# Patient Record
Sex: MALE | Race: OTHER | HISPANIC OR LATINO | URBAN - METROPOLITAN AREA
[De-identification: names, ages, dates, MRNs, and addresses within clinical notes are randomized per-mention and may not be internally consistent; named-entity substitution may affect disease eponyms.]

---

## 2019-08-26 ENCOUNTER — EMERGENCY (EMERGENCY)
Facility: HOSPITAL | Age: 29
LOS: 1 days | Discharge: HOME | End: 2019-08-26
Attending: EMERGENCY MEDICINE
Payer: COMMERCIAL

## 2019-08-26 VITALS
HEART RATE: 79 BPM | HEIGHT: 62 IN | WEIGHT: 132.06 LBS | RESPIRATION RATE: 20 BRPM | DIASTOLIC BLOOD PRESSURE: 85 MMHG | OXYGEN SATURATION: 98 % | SYSTOLIC BLOOD PRESSURE: 129 MMHG | TEMPERATURE: 99 F

## 2019-08-26 PROCEDURE — 99282 EMERGENCY DEPT VISIT SF MDM: CPT

## 2019-08-26 NOTE — ED PROVIDER NOTE - CLINICAL SUMMARY MEDICAL DECISION MAKING FREE TEXT BOX
29 yr old male with no hx presents to ed c/o right hand burn about 40 mins pta.  pt states working, his right hand accidentally went into a pot of hot oil. workers immediately palced toothpaste to area.  pt states 5th finger feels blister otherwise no loss of motion or sensation.    encourage to use bacitracin, avoid popping blister and can see burn clinic if needed

## 2019-08-26 NOTE — ED PROVIDER NOTE - OBJECTIVE STATEMENT
29 yr old male with no hx presents to ed c/o right hand burn about 40 mins pta.  pt states working, his right hand accidentally went into a pot of hot oil. workers immediately palced toothpaste to area.  pt states 5th finger feels blister otherwise no loss of motion or sensation.

## 2024-05-03 NOTE — ED PROVIDER NOTE - DISCHARGE DATE
Physical Therapy    Visit Type: initial evaluation    Relevant History/Co-morbidities: Hx avascular necrotic R hip    SUBJECTIVE  Patient agreed to participate in therapy this date.  RN in agreement to work with patient for therapy session.  \"I still drive myself to the doctor.\" \"I used to work for [employer] in [place].\"  \"I know that if you don't move around, you start to lose your muscles.\"    Patient / Family Goal: return to previous functional status, maximize function and return home    Pain   Patient does not demonstrate pain behaviors.     OBJECTIVE     Cognitive Status   Level of Consciousness   - alert  Arousal Alertness   - appropriate responses to stimuli  Affect/Behavior    - calm, cooperative and pleasant  Orientation    - Oriented to: person, place, time and situation  Functional Communication   - Overall Status: within functional limits   - Forms of Communication: verbal  Attention Span    - Attention: intact  Following Direction   - follows all commands and directions consistently  Transition Between Tasks   - transitions without difficulty  Memory   - appears intact  Safety Awareness/Insight   - intact  Awareness of Deficits   - fully aware of deficits  Verbal Expression   - intact    Vitals:  Heart rate at beginning of session was 141-142 BPM; RN aware and present in room. Clearance received for patient to mobilize in room. Heart rate increased with physical activity; highest reading was 150 BPM after ambulating ~15 feet in room.    Posture:  - Seated: good forward head and thoracic kyphosis  - Standing: good forward head and thoracic kyphosis  - Shoulders: rounded      Range of Motion (ROM)   (degrees unless noted; active unless noted; norms in ( ); negative=lacking to 0, positive=beyond 0)  WFL: LLE, RLE  Comments: See Occupational Therapy note for further details regarding bilateral upper extremities.    Strength  (out of 5 unless noted, standard test position unless noted)   WFL:  LLE, RLE  Hip:    - Flexion:         Left: 2+         Right: 2-  Knee:    - Extension:         Left: 4-         Right: 4-  Ankle:    - Inversion:         Left: 3          Right: 3  Comments / Details: Patient observed to grossly move bilateral lower extremities against gravity with at least 3+/5 strength. Patient able to stand with adequate strength and ambulate with 2-wheeled rolling walker. See Occupational Therapy note for further details regarding bilateral upper extremities.      Sitting Balance  (CLINT = base of support)  Static      - Trial 1 details: stand by assist, with double UE support and with double LE support  Dynamic      - Trial 1 details: stand by assist, with double LE support and with double UE support    Standing Balance  (CLINT = base of support)  Firm Surface: Double Leg      - Static, Eyes Open       - Trial 1 details: contact guard and with double UE support     - Dynamic, Eyes Open       - Trial 1 details: contact guard and with double UE support       Transfers  Assistive devices: gait belt, 2-wheeled walker  - Sit to stand: moderate assist  - Stand to sit: minimal assist  Verbal cues provided to use bilateral upper extremities when reaching back/pushing off for sit <> stand transfers to maximize leverage and safety.    Ambulation / Gait  - Assistive device: gait belt and 2-wheeled walker  - Distance (feet unless otherwise indicated): 15  in room. Limited ambulation distance to stay within room, per RN request.  - Assist Level: moderate assist  - Surface: even  - Description: decreased jc/pace, decreased step length left, decreased step length right and narrow base of support  Patient with good response to ambulation trial. Difficulty observed with right hip flexion during swing phase of gait, limited patient's ability to achieve proper heel strike. Patient benefits from multiple verbal cues to widen base of support and increase bilateral step size for improved dynamic stability. Limited  ambulation distance this session due to Heart rate 150 BPM; RN aware. Patient also reports increased dizziness. Patient was safely seated and repositioned comfortably. Symptoms improved once patient at rest.     Interventions     Supine    Lower Extremity: Right: SLR, hip abduction and hip adduction (gait-belt assist due to necrotic hip pain), AROM, 10 reps, 1 sets  Training provided: activity tolerance, bed mobility training, safety training, use of assistive device, functional ambulation, transfer training and balance retraining    Skilled input: Verbal instruction/cues and tactile instruction/cues  Verbal Consent: Writer verbally educated and received verbal consent for hand placement, positioning of patient, and techniques to be performed today from patient for clothing adjustments for techniques, therapist position for techniques and hand placement and palpation for techniques as described above and how they are pertinent to the patient's plan of care.         Education:   - Present and ready to learn: patient  Education provided during session:  - Role of Physical Therapy, benefits of skilled physical therapy, importance of out-of-bed activity, activity progression, energy conservation, therapy goals, and plan of care  - Results of above outlined education: Verbalizes understanding and Demonstrates understanding    ASSESSMENT   Patient will benefit from skilled therapy to address listed impairments and functional limitations.  Interferring components: decreased activity tolerance    Discharge needs based on today's assessment:   - Current level of function: slightly below baseline level of function   - Therapy needs at discharge: therapy 1-3 times per week (Pending progress, pain control, and weakness)   - Activities of daily living (ADLs) requiring support at discharge: bed mobility, transfers, ambulation and stairs   - Instrumental activities of daily living (IADLs) requiring support at discharge: community  mobility, home management and emergency responses   - Impairments that require further therapy intervention: activity tolerance, pain, balance and strength    AM-PAC  - Generalized Prior Level of Function: IND/MOD I ([facility] 22-24)       Key: MOD A=moderate assistance, IND/MOD I=independent/modified independent  - Generalized Current Level of Function     - Current Mobility Score: 15       AM-PAC Scoring Key= >21 Modified Independent; 20-21 Supervision; 18-19 Minimal assist; 13-18 Moderate assist; 9-12 Max assist; <9 Total assist    Therapy Diagnosis:  Other Abnormalities of Gait and Mobility        Predicted patient presentation: Moderate (evolving) - Patient comorbidities and complexities, as defined above, may have varying impact on steady progress for prescribed plan of care.    PLAN (while hospitalized)  Suggestions for next session as indicated: Progress functional ambulation and distance.  A minimum of 8 minutes per session x 1 week in the acute setting.  PT Frequency: 3-5 x per week    PT/OT Mobility Equipment for Discharge: Has rollator  PT/OT ADL Equipment for Discharge: Denying needs    Interventions: balance, bed mobility, endurance training, functional transfer training, gait training, HEP train/position, ROM, safety education, strengthening and energy conservation  Agreement to plan and goals: patient agrees with goals and treatment plan        GOALS  Long Term Goals: (to be met by time of discharge from hospital)  Sit to supine: Patient will complete sit to supine supervision.  Supine to sit: Patient will complete supine to sit supervision.  Sit to stand: Patient will complete sit to stand transfer with gait belt and 2-wheeled walker, supervision.   Stand to sit: Patient will complete stand to sit transfer with gait belt and 2-wheeled walker, supervision.   Ambulation (even): Patient will ambulate on even surface for 50 feet with gait belt and 2-wheeled walker, supervision.   Documented in the chart in  the following areas: Prior Level of Function. Assessment/Plan.      Patient at End of Session:   Location: in chair  Safety measures: alarm system in place/re-engaged, call light within reach, equipment intact and lines intact  Handoff to: nurse      Therapy procedure time and total treatment time can be found documented on the Time Entry flowsheet   26-Aug-2019